# Patient Record
Sex: MALE | Race: OTHER | HISPANIC OR LATINO | URBAN - METROPOLITAN AREA
[De-identification: names, ages, dates, MRNs, and addresses within clinical notes are randomized per-mention and may not be internally consistent; named-entity substitution may affect disease eponyms.]

---

## 2019-10-29 ENCOUNTER — OUTPATIENT (OUTPATIENT)
Dept: OUTPATIENT SERVICES | Facility: HOSPITAL | Age: 74
LOS: 1 days | End: 2019-10-29
Payer: MEDICARE

## 2019-10-29 VITALS
TEMPERATURE: 98 F | WEIGHT: 240.08 LBS | DIASTOLIC BLOOD PRESSURE: 90 MMHG | HEART RATE: 67 BPM | OXYGEN SATURATION: 97 % | HEIGHT: 68.5 IN | SYSTOLIC BLOOD PRESSURE: 160 MMHG | RESPIRATION RATE: 16 BRPM

## 2019-10-29 DIAGNOSIS — K08.409 PARTIAL LOSS OF TEETH, UNSPECIFIED CAUSE, UNSPECIFIED CLASS: Chronic | ICD-10-CM

## 2019-10-29 DIAGNOSIS — Z01.818 ENCOUNTER FOR OTHER PREPROCEDURAL EXAMINATION: ICD-10-CM

## 2019-10-29 DIAGNOSIS — F32.9 MAJOR DEPRESSIVE DISORDER, SINGLE EPISODE, UNSPECIFIED: ICD-10-CM

## 2019-10-29 DIAGNOSIS — Z98.890 OTHER SPECIFIED POSTPROCEDURAL STATES: Chronic | ICD-10-CM

## 2019-10-29 DIAGNOSIS — M26.619 ADHESIONS AND ANKYLOSIS OF TEMPOROMANDIBULAR JOINT, UNSPECIFIED SIDE: ICD-10-CM

## 2019-10-29 DIAGNOSIS — I10 ESSENTIAL (PRIMARY) HYPERTENSION: ICD-10-CM

## 2019-10-29 DIAGNOSIS — Z98.49 CATARACT EXTRACTION STATUS, UNSPECIFIED EYE: Chronic | ICD-10-CM

## 2019-10-29 DIAGNOSIS — M26.623 ARTHRALGIA OF BILATERAL TEMPOROMANDIBULAR JOINT: ICD-10-CM

## 2019-10-29 LAB
ANION GAP SERPL CALC-SCNC: 12 MMO/L — SIGNIFICANT CHANGE UP (ref 7–14)
BLD GP AB SCN SERPL QL: NEGATIVE — SIGNIFICANT CHANGE UP
BUN SERPL-MCNC: 19 MG/DL — SIGNIFICANT CHANGE UP (ref 7–23)
CALCIUM SERPL-MCNC: 9.2 MG/DL — SIGNIFICANT CHANGE UP (ref 8.4–10.5)
CHLORIDE SERPL-SCNC: 104 MMOL/L — SIGNIFICANT CHANGE UP (ref 98–107)
CO2 SERPL-SCNC: 24 MMOL/L — SIGNIFICANT CHANGE UP (ref 22–31)
CREAT SERPL-MCNC: 0.81 MG/DL — SIGNIFICANT CHANGE UP (ref 0.5–1.3)
GLUCOSE SERPL-MCNC: 125 MG/DL — HIGH (ref 70–99)
HCT VFR BLD CALC: 46.6 % — SIGNIFICANT CHANGE UP (ref 39–50)
HGB BLD-MCNC: 15.1 G/DL — SIGNIFICANT CHANGE UP (ref 13–17)
MCHC RBC-ENTMCNC: 28.9 PG — SIGNIFICANT CHANGE UP (ref 27–34)
MCHC RBC-ENTMCNC: 32.4 % — SIGNIFICANT CHANGE UP (ref 32–36)
MCV RBC AUTO: 89.1 FL — SIGNIFICANT CHANGE UP (ref 80–100)
NRBC # FLD: 0 K/UL — SIGNIFICANT CHANGE UP (ref 0–0)
PLATELET # BLD AUTO: 307 K/UL — SIGNIFICANT CHANGE UP (ref 150–400)
PMV BLD: 9.5 FL — SIGNIFICANT CHANGE UP (ref 7–13)
POTASSIUM SERPL-MCNC: 4.2 MMOL/L — SIGNIFICANT CHANGE UP (ref 3.5–5.3)
POTASSIUM SERPL-SCNC: 4.2 MMOL/L — SIGNIFICANT CHANGE UP (ref 3.5–5.3)
RBC # BLD: 5.23 M/UL — SIGNIFICANT CHANGE UP (ref 4.2–5.8)
RBC # FLD: 13.3 % — SIGNIFICANT CHANGE UP (ref 10.3–14.5)
RH IG SCN BLD-IMP: POSITIVE — SIGNIFICANT CHANGE UP
SODIUM SERPL-SCNC: 140 MMOL/L — SIGNIFICANT CHANGE UP (ref 135–145)
WBC # BLD: 8.44 K/UL — SIGNIFICANT CHANGE UP (ref 3.8–10.5)
WBC # FLD AUTO: 8.44 K/UL — SIGNIFICANT CHANGE UP (ref 3.8–10.5)

## 2019-10-29 PROCEDURE — 93010 ELECTROCARDIOGRAM REPORT: CPT

## 2019-10-29 NOTE — H&P PST ADULT - MUSCULOSKELETAL COMMENTS
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 Arthralgia of temporomandibular joint, Articular disc disorder of temporomandibular joint "clicking, pain, locking right jaw"

## 2019-10-29 NOTE — H&P PST ADULT - NEGATIVE GENERAL GENITOURINARY SYMPTOMS
no renal colic/no flank pain R/no urinary hesitancy/normal urinary frequency/no bladder infections/no dysuria/no flank pain L

## 2019-10-29 NOTE — H&P PST ADULT - MS EXT PE MLT D E PC
"PT Name: Tasneem Lynn  MR #: [id number]  Physician Query Form - CKD Clarification     CDS: oTyin Cabrera RN, CCDS         Contact information :[phone]  [email or web address]     This form is a permanent document in the medical record.     Query Date: May 31, 2018    By submitting this query, we are merely seeking further clarification of documentation. Please utilize your independent clinical judgment when addressing the question(s) below.    The Medical record contains the following:     Indicators   Supporting Clinical Findings   Location in Medical Record   x CKD or Chronic Kidney (Renal) Failure / Disease "Acute on chronic renal failure; likely ATN associated with sepsis and UTI as above  As evidenced by decrease in GFR.  Baseline creatinine = approximately 1.5" H&P 5/30/18   x BUN/Creatinine                          GFR BUN 31, creatinine 2.9, GFR 18  BUN 30, creatinine 2.1, GFR 27 Lab 5/29/18  Lab 5/31/18    Dehydration      Nausea / Vomiting      Dialysis / CRRT      Medication      Treatment     x Other Chronic Conditions DM, HTN H&P 5/30/18   x Other "Baseline creatinine = approximately 1.5" H&P 5/30/18       Doctor, please further specify the stage of CKD.        [employer] Definitions  Stage Description  eGFR (mL/min)   [  ]     I Slight kidney damage with normal or increased filtration 90+   [  ]     II Mildly reduced kidney function 60-89   [ x ]     III Moderately reduced kidney function 30-59   [  ]     IV Severely reduced kidney function 15-29     [  ] Other (please specify): ________________________  [  ] Clinically Undetermined    Please document in your progress notes daily for the duration of treatment until resolved and include in your discharge summary.                      " no cyanosis/no pedal edema/no clubbing

## 2019-10-29 NOTE — H&P PST ADULT - ASSESSMENT
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 Arthralgia of temporomandibular joint, Articular disc disorder of temporomandibular joint

## 2019-10-29 NOTE — H&P PST ADULT - NEGATIVE ENMT SYMPTOMS
no nasal obstruction/no nasal congestion/no hearing difficulty/no sinus symptoms/no nasal discharge/no throat pain/no dysphagia

## 2019-10-29 NOTE — H&P PST ADULT - NEGATIVE NEUROLOGICAL SYMPTOMS
no paresthesias/no generalized seizures/no difficulty walking/no loss of consciousness/no vertigo/no headache/no weakness

## 2019-10-29 NOTE — H&P PST ADULT - NSANTHOSAYNRD_GEN_A_CORE
No. RON screening performed.  STOP BANG Legend: 0-2 = LOW Risk; 3-4 = INTERMEDIATE Risk; 5-8 = HIGH Risk

## 2019-10-29 NOTE — H&P PST ADULT - NSICDXPROBLEM_GEN_ALL_CORE_FT
PROBLEM DIAGNOSES  Problem: Adhesions and ankylosis of temporomandibular joint  Assessment and Plan: Scheduled for right temporomandibular joint arthroplasty, temporalis flap on 11/7/2019. labs done and results pending.  Verbal and written preop instruction given and explained. Patient verbalized understanding. Famotidine provided with instruction. Patient verbalized understanding.     Problem: Hypertension  Assessment and Plan: Instructed to take lisinopril, metoprolol AM of surgery with a sip of water.  EKG done in PST. comparison EKG requested. Elevated BP noted in PST. Patient has an appt with PCP on 10/30/2019 for medical evaluation.  high risk for sleep apnea identified by STOP BANG survey. OR booking notified via fax.     Problem: Depression  Assessment and Plan: Instructed to take venlafaxine AM of surgery with a sip of water.

## 2019-10-29 NOTE — H&P PST ADULT - NSICDXPASTSURGICALHX_GEN_ALL_CORE_FT
PAST SURGICAL HISTORY:  H/O hemorrhoidectomy > 10 years ago    S/P arthroscopy of knee left 2013    S/P cataract surgery 2016    [place] teeth extracted PAST SURGICAL HISTORY:  H/O hemorrhoidectomy > 10 years ago    S/P arthroscopy of knee left 2013    S/P cataract surgery 2016    [place] teeth extracted

## 2019-10-29 NOTE — H&P PST ADULT - NSICDXPASTMEDICALHX_GEN_ALL_CORE_FT
PAST MEDICAL HISTORY:  Hypertension PAST MEDICAL HISTORY:  Depression     H/O right bundle branch block     Hypertension

## 2019-10-29 NOTE — H&P PST ADULT - HISTORY OF PRESENT ILLNESS
73 yo male with hx of  with c/o right jaw pain, clicking, earache for 1 year, went for an evaluation, mouthguard was recommended by dentist, which he was unable to use, referred to oral surgeon, seen by Dr. Hernandez, had x-ray & MRI done, now scheduled for right temporomandibular joint arthroplasty, temporalis flap on 11/7/2019. 75 yo male with hx of HTN, RBB, depression presents to have PST evaluation with c/o right jaw pain, clicking, earache for 1 year, went for an evaluation, mouthguard was recommended by dentist, which he was "unable to use d/t post nasal drip", referred to oral surgeon, seen by Dr. Hernandez, had x-ray & MRI done, now scheduled for right temporomandibular joint arthroplasty, temporalis flap on 11/7/2019.

## 2019-11-06 ENCOUNTER — TRANSCRIPTION ENCOUNTER (OUTPATIENT)
Age: 74
End: 2019-11-06

## 2019-11-06 NOTE — ASU PATIENT PROFILE, ADULT - PSH
H/O hemorrhoidectomy  > 10 years ago  S/P arthroscopy of knee  left 2013  S/P cataract surgery  2016  [place] teeth extracted

## 2019-11-07 ENCOUNTER — RESULT REVIEW (OUTPATIENT)
Age: 74
End: 2019-11-07

## 2019-11-07 ENCOUNTER — INPATIENT (INPATIENT)
Facility: HOSPITAL | Age: 74
LOS: 0 days | Discharge: HOME | End: 2019-11-08
Attending: ORAL & MAXILLOFACIAL SURGERY | Admitting: ORAL & MAXILLOFACIAL SURGERY
Payer: MEDICARE

## 2019-11-07 VITALS
RESPIRATION RATE: 16 BRPM | HEIGHT: 68.5 IN | TEMPERATURE: 98 F | WEIGHT: 240.08 LBS | DIASTOLIC BLOOD PRESSURE: 81 MMHG | SYSTOLIC BLOOD PRESSURE: 162 MMHG | HEART RATE: 69 BPM | OXYGEN SATURATION: 97 %

## 2019-11-07 DIAGNOSIS — K08.409 PARTIAL LOSS OF TEETH, UNSPECIFIED CAUSE, UNSPECIFIED CLASS: Chronic | ICD-10-CM

## 2019-11-07 DIAGNOSIS — Z98.890 OTHER SPECIFIED POSTPROCEDURAL STATES: Chronic | ICD-10-CM

## 2019-11-07 DIAGNOSIS — M26.621 ARTHRALGIA OF RIGHT TEMPOROMANDIBULAR JOINT: ICD-10-CM

## 2019-11-07 DIAGNOSIS — Z98.49 CATARACT EXTRACTION STATUS, UNSPECIFIED EYE: Chronic | ICD-10-CM

## 2019-11-07 LAB — RH IG SCN BLD-IMP: POSITIVE — SIGNIFICANT CHANGE UP

## 2019-11-07 PROCEDURE — 88305 TISSUE EXAM BY PATHOLOGIST: CPT | Mod: 26

## 2019-11-07 RX ORDER — ENOXAPARIN SODIUM 100 MG/ML
40 INJECTION SUBCUTANEOUS ONCE
Refills: 0 | Status: DISCONTINUED | OUTPATIENT
Start: 2019-11-07 | End: 2019-11-08

## 2019-11-07 RX ORDER — LANOLIN ALCOHOL/MO/W.PET/CERES
1 CREAM (GRAM) TOPICAL
Qty: 0 | Refills: 0 | DISCHARGE

## 2019-11-07 RX ORDER — ENOXAPARIN SODIUM 100 MG/ML
30 INJECTION SUBCUTANEOUS EVERY 12 HOURS
Refills: 0 | Status: DISCONTINUED | OUTPATIENT
Start: 2019-11-07 | End: 2019-11-07

## 2019-11-07 RX ORDER — ACETAMINOPHEN 500 MG
650 TABLET ORAL EVERY 6 HOURS
Refills: 0 | Status: DISCONTINUED | OUTPATIENT
Start: 2019-11-07 | End: 2019-11-08

## 2019-11-07 RX ORDER — IBUPROFEN 200 MG
600 TABLET ORAL EVERY 6 HOURS
Refills: 0 | Status: DISCONTINUED | OUTPATIENT
Start: 2019-11-07 | End: 2019-11-08

## 2019-11-07 RX ORDER — FENTANYL CITRATE 50 UG/ML
25 INJECTION INTRAVENOUS
Refills: 0 | Status: DISCONTINUED | OUTPATIENT
Start: 2019-11-07 | End: 2019-11-07

## 2019-11-07 RX ORDER — OXYCODONE HYDROCHLORIDE 5 MG/1
5 TABLET ORAL EVERY 4 HOURS
Refills: 0 | Status: DISCONTINUED | OUTPATIENT
Start: 2019-11-07 | End: 2019-11-08

## 2019-11-07 RX ORDER — VENLAFAXINE HCL 75 MG
1 CAPSULE, EXT RELEASE 24 HR ORAL
Qty: 0 | Refills: 0 | DISCHARGE

## 2019-11-07 RX ORDER — LISINOPRIL 2.5 MG/1
1 TABLET ORAL
Qty: 0 | Refills: 0 | DISCHARGE

## 2019-11-07 RX ORDER — CEFAZOLIN SODIUM 1 G
2000 VIAL (EA) INJECTION EVERY 8 HOURS
Refills: 0 | Status: DISCONTINUED | OUTPATIENT
Start: 2019-11-07 | End: 2019-11-08

## 2019-11-07 RX ORDER — METOPROLOL TARTRATE 50 MG
1 TABLET ORAL
Qty: 0 | Refills: 0 | DISCHARGE

## 2019-11-07 RX ORDER — ATORVASTATIN CALCIUM 80 MG/1
1 TABLET, FILM COATED ORAL
Qty: 0 | Refills: 0 | DISCHARGE

## 2019-11-07 RX ORDER — SODIUM CHLORIDE 9 MG/ML
1000 INJECTION, SOLUTION INTRAVENOUS
Refills: 0 | Status: DISCONTINUED | OUTPATIENT
Start: 2019-11-07 | End: 2019-11-07

## 2019-11-07 RX ORDER — ONDANSETRON 8 MG/1
4 TABLET, FILM COATED ORAL ONCE
Refills: 0 | Status: DISCONTINUED | OUTPATIENT
Start: 2019-11-07 | End: 2019-11-08

## 2019-11-07 RX ORDER — ACETAMINOPHEN 500 MG
650 TABLET ORAL EVERY 6 HOURS
Refills: 0 | Status: DISCONTINUED | OUTPATIENT
Start: 2019-11-07 | End: 2019-11-07

## 2019-11-07 RX ORDER — IBUPROFEN 200 MG
600 TABLET ORAL EVERY 6 HOURS
Refills: 0 | Status: DISCONTINUED | OUTPATIENT
Start: 2019-11-07 | End: 2019-11-07

## 2019-11-07 RX ORDER — OXYCODONE HYDROCHLORIDE 5 MG/1
10 TABLET ORAL EVERY 4 HOURS
Refills: 0 | Status: DISCONTINUED | OUTPATIENT
Start: 2019-11-07 | End: 2019-11-08

## 2019-11-07 RX ORDER — SODIUM CHLORIDE 9 MG/ML
1000 INJECTION, SOLUTION INTRAVENOUS
Refills: 0 | Status: DISCONTINUED | OUTPATIENT
Start: 2019-11-07 | End: 2019-11-08

## 2019-11-07 RX ADMIN — Medication 600 MILLIGRAM(S): at 19:30

## 2019-11-07 RX ADMIN — Medication 650 MILLIGRAM(S): at 19:30

## 2019-11-07 RX ADMIN — Medication 100 MILLIGRAM(S): at 20:14

## 2019-11-07 RX ADMIN — Medication 600 MILLIGRAM(S): at 23:34

## 2019-11-07 RX ADMIN — SODIUM CHLORIDE 110 MILLILITER(S): 9 INJECTION, SOLUTION INTRAVENOUS at 16:15

## 2019-11-07 RX ADMIN — Medication 650 MILLIGRAM(S): at 18:44

## 2019-11-07 RX ADMIN — Medication 600 MILLIGRAM(S): at 18:45

## 2019-11-07 NOTE — H&P ADULT - NSHPPHYSICALEXAM_GEN_ALL_CORE
Gen well-appearing, NAD  HEENT: right TMJ pain  Heart: RRR, nl S1 and S2  Lungs: CTAB  Abd: soft/NT/ND

## 2019-11-07 NOTE — H&P ADULT - HISTORY OF PRESENT ILLNESS
75 y/o man has right temporomandibular dysfunction. Pt to have right TMJ arthroplasty with temporalis flap today with Dr. Hernandez.

## 2019-11-07 NOTE — PROGRESS NOTE ADULT - SUBJECTIVE AND OBJECTIVE BOX
74y Male 4 hours s/p right TMJ arthroplasty with temporalis flap.  Patient examined at bedside on floor.  MELINA since OR.  Patient states pain is well controlled.  Denies any fever, chills, nausea, vomiting.  Patient has ambulated, drank around 2-3 cups of water, and voided.    Vital Signs Last 24 Hrs  T(C): 36.6 (07 Nov 2019 20:00), Max: 36.8 (07 Nov 2019 10:20)  T(F): 97.9 (07 Nov 2019 20:00), Max: 98.2 (07 Nov 2019 10:20)  HR: 95 (07 Nov 2019 20:00) (69 - 99)  BP: 121/81 (07 Nov 2019 19:00) (117/68 - 162/81)  BP(mean): 92 (07 Nov 2019 19:00) (75 - 104)  RR: 15 (07 Nov 2019 20:00) (11 - 25)  SpO2: 98% (07 Nov 2019 20:00) (94% - 99%)    PE:   Gen: AAOx3, NAD    EOE: right mid-facial edema, dried blood in right ear, right endaural incision hemostatic, sutures C/D/I  IOE: Occlusion stable and reproducible, VELIA~ gingiva pink and perfused,, airway patent, Wounds hemostatic.              I&O's Summary    07 Nov 2019 07:01  -  07 Nov 2019 21:38  --------------------------------------------------------  IN: 870 mL / OUT: 450 mL / NET: 420 mL        A/P:  74y Male 4 hours s/p right TMJ arthroplasty w/ tmporalis flap, is progressing well  - C/w ancef  - soft diet  - Continue pain control  - Encourage PO fluids, voiding, ambulation.  - DVT PPX    Will d/w attending. 74y Male 4 hours s/p right TMJ arthroplasty with temporalis flap.  Patient examined at bedside on floor.  MELINA since OR.  Patient states pain is well controlled.  Denies any fever, chills, nausea, vomiting.  Pt reports mild sore throat.  Patient has ambulated, drank around 2-3 cups of water, and voided.    Vital Signs Last 24 Hrs  T(C): 36.6 (07 Nov 2019 20:00), Max: 36.8 (07 Nov 2019 10:20)  T(F): 97.9 (07 Nov 2019 20:00), Max: 98.2 (07 Nov 2019 10:20)  HR: 95 (07 Nov 2019 20:00) (69 - 99)  BP: 121/81 (07 Nov 2019 19:00) (117/68 - 162/81)  BP(mean): 92 (07 Nov 2019 19:00) (75 - 104)  RR: 15 (07 Nov 2019 20:00) (11 - 25)  SpO2: 98% (07 Nov 2019 20:00) (94% - 99%)    PE:   Gen: AAOx3, NAD    EOE: right mid-facial edema, jaw bra with gauze in place over right preauricular region, right incision (endaural with superior temporal extension) hemostatic, closed, sutures C/D/I  IOE: Occlusion stable and reproducible, VELIA~2-mm, no clicking, popping , crepitus of TMJ. airway patent, uvula midline            I&O's Summary    07 Nov 2019 07:01  -  07 Nov 2019 21:38  --------------------------------------------------------  IN: 870 mL / OUT: 450 mL / NET: 420 mL        A/P:  74y Male 4 hours s/p right TMJ arthroplasty w/ tmporalis flap, is progressing well  - C/w ancef  - soft diet  - Continue pain control  - Encourage PO fluids, voiding, ambulation.  - DVT PPX    Will d/w attending. 74y Male 4 hours s/p right TMJ arthroplasty with temporalis flap.  Patient examined at bedside on floor.  MELINA since OR.  Patient states pain is well controlled.  Denies any fever, chills, nausea, vomiting.  Pt reports mild sore throat.  Patient has ambulated, drank around 2-3 cups of water, and voided.    Vital Signs Last 24 Hrs  T(C): 36.6 (07 Nov 2019 20:00), Max: 36.8 (07 Nov 2019 10:20)  T(F): 97.9 (07 Nov 2019 20:00), Max: 98.2 (07 Nov 2019 10:20)  HR: 95 (07 Nov 2019 20:00) (69 - 99)  BP: 121/81 (07 Nov 2019 19:00) (117/68 - 162/81)  BP(mean): 92 (07 Nov 2019 19:00) (75 - 104)  RR: 15 (07 Nov 2019 20:00) (11 - 25)  SpO2: 98% (07 Nov 2019 20:00) (94% - 99%)    PE:   Gen: AAOx3, NAD    EOE: right mid-facial edema, jaw bra with gauze in place over right preauricular region, right incision (endaural with superior temporal extension) hemostatic, closed, sutures C/D/I,   CN II - XII grossly intact  IOE: Occlusion stable and reproducible, VELIA~2-mm, no clicking, popping , crepitus of TMJ. airway patent, uvula midline            I&O's Summary    07 Nov 2019 07:01  -  07 Nov 2019 21:38  --------------------------------------------------------  IN: 870 mL / OUT: 450 mL / NET: 420 mL        A/P:  74y Male 4 hours s/p right TMJ arthroplasty w/ tmporalis flap, is progressing well  - C/w ancef  - soft diet  - Continue pain control  - Encourage PO fluids, voiding, ambulation.  - DVT PPX    Will d/w attending.

## 2019-11-07 NOTE — H&P ADULT - ASSESSMENT
73 y/o man has right temporomandibular dysfunction.   - Pt to have right TMJ arthroplasty with temporalis flap today with Dr. Hernandez.

## 2019-11-08 ENCOUNTER — TRANSCRIPTION ENCOUNTER (OUTPATIENT)
Age: 74
End: 2019-11-08

## 2019-11-08 VITALS
OXYGEN SATURATION: 95 % | SYSTOLIC BLOOD PRESSURE: 130 MMHG | HEART RATE: 96 BPM | RESPIRATION RATE: 16 BRPM | DIASTOLIC BLOOD PRESSURE: 76 MMHG | TEMPERATURE: 98 F

## 2019-11-08 RX ORDER — CEPHALEXIN 500 MG
1 CAPSULE ORAL
Qty: 0 | Refills: 0 | DISCHARGE

## 2019-11-08 RX ORDER — MELOXICAM 15 MG/1
1 TABLET ORAL
Qty: 0 | Refills: 0 | DISCHARGE

## 2019-11-08 RX ADMIN — Medication 600 MILLIGRAM(S): at 00:30

## 2019-11-08 RX ADMIN — Medication 100 MILLIGRAM(S): at 11:25

## 2019-11-08 RX ADMIN — Medication 100 MILLIGRAM(S): at 03:26

## 2019-11-08 NOTE — PROGRESS NOTE ADULT - SUBJECTIVE AND OBJECTIVE BOX
74y Male 1 day s/p right TMJ arthroplasty with temporalis flap.  Patient examined at bedside on admitting floor.  MELINA o/n.  Patient states pain is well controlled.  Denies any fever, chills, nausea, vomiting.  Pt reports mild sore throat.  Patient has ambulated, drank around 2-3 cups of water, and voided (x2-3).    ICU Vital Signs Last 24 Hrs  T(C): 36.6 (08 Nov 2019 05:06), Max: 36.8 (07 Nov 2019 10:20)  T(F): 97.8 (08 Nov 2019 05:06), Max: 98.2 (07 Nov 2019 10:20)  HR: 99 (08 Nov 2019 05:06) (69 - 105)  BP: 113/70 (08 Nov 2019 05:06) (113/70 - 162/81)  BP(mean): 101 (07 Nov 2019 20:00) (75 - 104)  ABP: --  ABP(mean): --  RR: 16 (08 Nov 2019 05:06) (11 - 25)  SpO2: 99% (08 Nov 2019 05:06) (94% - 99%)      PE:   Gen: AAOx3, NAD  EOE: right mid-facial edema, jaw bra with gauze in place over right preauricular region -- non-saturated, right incision (endaural with superior temporal extension) hemostatic, closed, sutures C/D/I,   CN II - XII grossly intact  IOE: Occlusion stable and reproducible, VELIA~2-mm, no clicking, popping , crepitus of TMJ. airway patent, uvula midline      I&O's Detail    07 Nov 2019 07:01  -  08 Nov 2019 07:00  --------------------------------------------------------  IN:    lactated ringers.: 30 mL    lactated ringers.: 1320 mL    Oral Fluid: 400 mL  Total IN: 1750 mL    OUT:    Voided: 650 mL  Total OUT: 650 mL    Total NET: 1100 mL            A/P:  74y Male 1 day s/p right TMJ arthroplasty w/ temporalis flap, is progressing well, recovering as anticipated.   - C/w ancef  - soft diet  - Continue pain control  - Encourage PO fluids, voiding, ambulation.  - DVT PPX

## 2019-11-08 NOTE — DISCHARGE NOTE PROVIDER - NSDCCPCAREPLAN_GEN_ALL_CORE_FT
PRINCIPAL DISCHARGE DIAGNOSIS  Diagnosis: History of temporomandibular joint disorder  Assessment and Plan of Treatment:

## 2019-11-08 NOTE — DISCHARGE NOTE PROVIDER - HOSPITAL COURSE
74 year old male presents for right TMJ arthroplasty with temporalis flap in OR with Dr. Hernandez. Operation completed without complication. Extubated in OR and transferred to PACU in stable condition. When PACU criteria met, transferred to floor. MELINA o/n. In morning Patient examined and pain continues to be well controlled, tolerating PO, ambulating, voiding. AFVSS. Hemostatic incision, Occlusion stable and reproducible. Pt stable for discharge home on PO medications

## 2019-11-08 NOTE — DISCHARGE NOTE PROVIDER - CARE PROVIDER_API CALL
James Hernandez (DDS)  OralMaxillofacial Surgery  [address] [facility], [address] [id number]  Phone: [phone]  Fax: [phone]  Follow Up Time:

## 2019-11-08 NOTE — DISCHARGE NOTE PROVIDER - NSDCMRMEDTOKEN_GEN_ALL_CORE_FT
atorvastatin 20 mg oral tablet: 1 tab(s) orally once a day  Keflex 500 mg oral capsule: 1 cap(s) orally every 12 hours  lisinopril 2.5 mg oral tablet: 1 tab(s) orally once a day AM  Melatonin 10 mg oral capsule: 1 cap(s) orally once a day AM  metoprolol succinate 25 mg oral tablet, extended release: 1 tab(s) orally once a dayAm  Mobic 7.5 mg oral tablet: 1 tab(s) orally once a day  Multiple Vitamins oral capsule: 1 cap(s) orally once a day  venlafaxine 150 mg oral capsule, extended release: 1 cap(s) orally once a day AM  Vicodin 5 mg-300 mg oral tablet: 1 tab(s) orally every 6 hours atorvastatin 20 mg oral tablet: 1 tab(s) orally once a day  Keflex 500 mg oral capsule: 1 cap(s) orally every 12 hours  lisinopril 2.5 mg oral tablet: 1 tab(s) orally once a day AM  Melatonin 10 mg oral capsule: 1 cap(s) orally once a day AM  metoprolol succinate 25 mg oral tablet, extended release: 1 tab(s) orally once a dayAm  Mobic 7.5 mg oral tablet: 1 tab(s) orally once a day  Multiple Vitamins oral capsule: 1 cap(s) orally once a day  venlafaxine 150 mg oral capsule, extended release: 1 cap(s) orally once a day AM  Vicodin 5 mg-300 mg oral tablet: 1 tab(s) orally every 6 hours, As Needed

## 2019-11-08 NOTE — DISCHARGE NOTE NURSING/CASE MANAGEMENT/SOCIAL WORK - PATIENT PORTAL LINK FT
You can access the FollowMyHealth Patient Portal offered by [facility] by registering at the following website: [email or web address]. By joining [facility]’s FollowMyHealth portal, you will also be able to view your health information using other applications (apps) compatible with our system.

## 2019-11-08 NOTE — DISCHARGE NOTE NURSING/CASE MANAGEMENT/SOCIAL WORK - NSDCPNINST_GEN_ALL_CORE
Watch for signs of infection; redness, swelling, fever, chills or heat, report such symptoms to the MD. No driving while taking pain medication, it causes drowsiness & constipation. Drink 6-8 glasses of fluids daily to promote hydration. No heavy lifting, pulling or pushing heavy objects. You may shower, do not submerge incision site in water. Maintain a soft, diet as tolerated. Follow up with the MD as per discharge orders.

## 2020-03-27 ENCOUNTER — EMERGENCY (EMERGENCY)
Facility: HOSPITAL | Age: 75
LOS: 0 days | Discharge: HOME | End: 2020-03-27
Attending: EMERGENCY MEDICINE
Payer: MEDICARE

## 2020-03-27 VITALS
RESPIRATION RATE: 17 BRPM | TEMPERATURE: 101 F | SYSTOLIC BLOOD PRESSURE: 155 MMHG | HEART RATE: 102 BPM | OXYGEN SATURATION: 98 % | DIASTOLIC BLOOD PRESSURE: 89 MMHG

## 2020-03-27 VITALS — WEIGHT: 240.08 LBS

## 2020-03-27 DIAGNOSIS — I10 ESSENTIAL (PRIMARY) HYPERTENSION: ICD-10-CM

## 2020-03-27 DIAGNOSIS — Z98.890 OTHER SPECIFIED POSTPROCEDURAL STATES: Chronic | ICD-10-CM

## 2020-03-27 DIAGNOSIS — I45.10 UNSPECIFIED RIGHT BUNDLE-BRANCH BLOCK: ICD-10-CM

## 2020-03-27 DIAGNOSIS — R50.9 FEVER, UNSPECIFIED: ICD-10-CM

## 2020-03-27 DIAGNOSIS — R06.02 SHORTNESS OF BREATH: ICD-10-CM

## 2020-03-27 DIAGNOSIS — B34.9 VIRAL INFECTION, UNSPECIFIED: ICD-10-CM

## 2020-03-27 DIAGNOSIS — Z98.49 CATARACT EXTRACTION STATUS, UNSPECIFIED EYE: Chronic | ICD-10-CM

## 2020-03-27 DIAGNOSIS — R51 HEADACHE: ICD-10-CM

## 2020-03-27 DIAGNOSIS — Z20.828 CONTACT WITH AND (SUSPECTED) EXPOSURE TO OTHER VIRAL COMMUNICABLE DISEASES: ICD-10-CM

## 2020-03-27 DIAGNOSIS — K08.409 PARTIAL LOSS OF TEETH, UNSPECIFIED CAUSE, UNSPECIFIED CLASS: Chronic | ICD-10-CM

## 2020-03-27 PROCEDURE — 93010 ELECTROCARDIOGRAM REPORT: CPT

## 2020-03-27 PROCEDURE — 99283 EMERGENCY DEPT VISIT LOW MDM: CPT

## 2020-03-27 PROCEDURE — 93005 ELECTROCARDIOGRAM TRACING: CPT

## 2020-03-27 RX ORDER — ACETAMINOPHEN 500 MG
975 TABLET ORAL ONCE
Refills: 0 | Status: COMPLETED | OUTPATIENT
Start: 2020-03-27 | End: 2020-03-27

## 2020-03-27 RX ORDER — ONDANSETRON 8 MG/1
1 TABLET, FILM COATED ORAL
Qty: 9 | Refills: 0
Start: 2020-03-27 | End: 2020-03-29

## 2020-03-27 RX ORDER — ONDANSETRON 8 MG/1
1 TABLET, FILM COATED ORAL
Qty: 20 | Refills: 0
Start: 2020-03-27

## 2020-03-27 RX ADMIN — Medication 975 MILLIGRAM(S): at 20:28

## 2020-03-27 NOTE — ED STATDOCS - CLINICAL SUMMARY MEDICAL DECISION MAKING FREE TEXT BOX
74M hx htn hld presents to the ED for SOB. Since Sat started getting fever tmax 101 body aches. was exposed to patients with COVID. + headache. Went to walk in clinic had covid testing and xray which was negative. went back to walk in clinic today had a repeat xray which was again negative. pt states he walked to mailbox today and felt like he couldn't catch breath. dry cough. no sputum. no chest pain. nausea no vomiting no abd pain. no diarrhea. no recent travel. non smoker non drinker. no allergies to meds.  Pt with clear lungs on exam, no respiratory distress, respiration rate 17, and exertional O2 sat 97%. Likely with COVID, though does not meet criteria for admission. Discussed with pt about home isolation and strict return precautions. 74M hx htn hld presents to the ED for SOB. Since Sat started getting fever tmax 101 body aches. was exposed to patients with COVID. + headache. Went to walk in clinic had covid testing and xray which was negative. went back to walk in clinic today had a repeat xray which was again negative. pt states he walked to mailbox today and felt like he couldn't catch breath. dry cough. no sputum. no chest pain. nausea no vomiting no abd pain. no diarrhea. no recent travel. non smoker non drinker. no allergies to meds. Pt with clear lungs on exam, no respiratory distress, respiration rate 17, and exertional O2 sat 97%. Likely with COVID, though does not meet criteria for admission. Discussed with pt about home isolation and strict return precautions.

## 2020-03-27 NOTE — ED STATDOCS - PSH
H/O hemorrhoidectomy  > 10 years ago  S/P arthroscopy of knee  left 2013  S/P cataract surgery  2016  [place] teeth extracted

## 2020-03-27 NOTE — ED STATDOCS - PHYSICAL EXAMINATION
Constitutional: NAD AAOx3  Eyes: PERRLA EOMI  Head: Normocephalic atraumatic  Mouth: MMM  Cardiac: regular rate   Resp: Lungs CTAB. No respiratory distress. Respiration rate 17, exertional O2 sat 97%  GI: Abd s/nt/nd  Neuro: CN2-12 intact  Skin: No rashes

## 2020-03-27 NOTE — ED STATDOCS - OBJECTIVE STATEMENT
74M hx htn hld presents to the ED for SOB. Since Sat started getting fever tmax 101 body aches. was exposed to patients with COVID. + headache. Went to walk in clinic had covid testing and xray which was negative. went back to walk in clinic today had a repeat xray which was again negative. pt states he walked to mailbox today and felt like he couldn't catch breath. dry cough. no sputum. no chest pain. nausea no vomiting no abd pain. no diarrhea. no recent travel. non smoker non drinker. no allergies to meds.

## 2020-03-27 NOTE — ED STATDOCS - NS_ ATTENDINGSCRIBEDETAILS _ED_A_ED_FT
I, Ole Peterson MD,  performed the initial face to face bedside interview with this patient regarding history of present illness, review of symptoms and relevant past medical, social and family history.  I completed an independent physical examination.  I was the initial provider who evaluated this patient.  The history, relevant review of systems, past medical and surgical history, medical decision making, and physical examination was documented by the scribe in my presence and I attest to the accuracy of the documentation.

## 2020-03-27 NOTE — ED STATDOCS - NS ED ROS FT
Constitutional: No chills. +fever  Eyes: No visual changes  HEENT: No throat pain  CV: No chest pain  Resp: +SOB, +cough, +THOMAS  GI: No abd pain or vomiting. +nausea   : No dysuria  MSK: +body aches   Skin: No rash  Neuro: +headache

## 2020-03-27 NOTE — ED STATDOCS - PATIENT PORTAL LINK FT
You can access the FollowMyHealth Patient Portal offered by [facility] by registering at the following website: [email or web address]. By joining [employer]’s FollowMyHealth portal, you will also be able to view your health information using other applications (apps) compatible with our system.

## 2020-03-27 NOTE — ED STATDOCS - PMH
Depression    H/O right bundle branch block    Hypertension Complex Repair And Xenograft Text: The defect edges were debeveled with a #15 scalpel blade.  The primary defect was closed partially with a complex linear closure.  Given the location of the defect, shape of the defect and the proximity to free margins a xenograft was deemed most appropriate to repair the remaining defect.  The graft was trimmed to fit the size of the remaining defect.  The graft was then placed in the primary defect, oriented appropriately, and sutured into place.

## 2020-03-28 PROBLEM — F32.9 MAJOR DEPRESSIVE DISORDER, SINGLE EPISODE, UNSPECIFIED: Chronic | Status: ACTIVE | Noted: 2019-10-29

## 2020-03-28 PROBLEM — I10 ESSENTIAL (PRIMARY) HYPERTENSION: Chronic | Status: ACTIVE | Noted: 2019-10-29

## 2020-03-28 PROBLEM — Z86.79 PERSONAL HISTORY OF OTHER DISEASES OF THE CIRCULATORY SYSTEM: Chronic | Status: ACTIVE | Noted: 2019-10-29

## 2021-03-02 PROBLEM — Z00.00 ENCOUNTER FOR PREVENTIVE HEALTH EXAMINATION: Status: ACTIVE | Noted: 2021-03-02

## 2021-03-03 ENCOUNTER — NON-APPOINTMENT (OUTPATIENT)
Age: 76
End: 2021-03-03

## 2021-03-03 ENCOUNTER — APPOINTMENT (OUTPATIENT)
Dept: OTOLARYNGOLOGY | Facility: CLINIC | Age: 76
End: 2021-03-03
Payer: MEDICARE

## 2021-03-03 VITALS
BODY MASS INDEX: 38.65 KG/M2 | SYSTOLIC BLOOD PRESSURE: 135 MMHG | HEART RATE: 69 BPM | DIASTOLIC BLOOD PRESSURE: 81 MMHG | WEIGHT: 255 LBS | HEIGHT: 68 IN | TEMPERATURE: 97.7 F

## 2021-03-03 DIAGNOSIS — Z80.1 FAMILY HISTORY OF MALIGNANT NEOPLASM OF TRACHEA, BRONCHUS AND LUNG: ICD-10-CM

## 2021-03-03 DIAGNOSIS — K21.9 GASTRO-ESOPHAGEAL REFLUX DISEASE W/OUT ESOPHAGITIS: ICD-10-CM

## 2021-03-03 DIAGNOSIS — J34.2 DEVIATED NASAL SEPTUM: ICD-10-CM

## 2021-03-03 DIAGNOSIS — Z83.3 FAMILY HISTORY OF DIABETES MELLITUS: ICD-10-CM

## 2021-03-03 DIAGNOSIS — R05 COUGH: ICD-10-CM

## 2021-03-03 DIAGNOSIS — Z86.79 PERSONAL HISTORY OF OTHER DISEASES OF THE CIRCULATORY SYSTEM: ICD-10-CM

## 2021-03-03 DIAGNOSIS — R09.82 POSTNASAL DRIP: ICD-10-CM

## 2021-03-03 DIAGNOSIS — J30.9 ALLERGIC RHINITIS, UNSPECIFIED: ICD-10-CM

## 2021-03-03 DIAGNOSIS — I51.9 HEART DISEASE, UNSPECIFIED: ICD-10-CM

## 2021-03-03 PROCEDURE — 99204 OFFICE O/P NEW MOD 45 MIN: CPT | Mod: 25

## 2021-03-03 PROCEDURE — 31231 NASAL ENDOSCOPY DX: CPT

## 2021-03-03 RX ORDER — OMEPRAZOLE 20 MG/1
20 CAPSULE, DELAYED RELEASE ORAL TWICE DAILY
Qty: 60 | Refills: 4 | Status: ACTIVE | COMMUNITY
Start: 2021-03-03 | End: 1900-01-01

## 2021-03-03 NOTE — ASSESSMENT
[FreeTextEntry1] : laryngeal signs reflux\par cough\par allergic rhinitis and pnd\par azelastine spray\par trial omeprazole 20 bid

## 2021-03-03 NOTE — PROCEDURE
[FreeTextEntry6] : Indication:  Unable to adequately examine nasal passages and sinus drainage with anterior rhinoscopy.\par Scope # 47\par Mild septal deviation is present on direct visualization on either side.\par Both inferior nasal turbinates are moderate in size with normal  appearing mucosa. \par The sinus endoscope was introduced into the right nares\par exam right middle meatus reveals no mucopus, polyps or inflammation.  The middle turbinate is unremarkable.\par The fiberoptic scope was introduced to the posterior pharynx and exam on endolarynx is wnl w good vocal cord mobility.\par No lesion noted in hypopharynx.\par erythema and mild to moderate posterior laryngeal sweling\par The scope was advanced and the sphenoethmoid region was inspected.\par The superior meatus and nasal vault are unremarkable.  The nasopharynx is unremarkable without inflammation or mass\par The sinus endoscope was introduced into the left nares\par exam of the left middle meatus reveals no mucopus, polyps or inflammation and the left middle turbinate is unremarkable.\par The scope was advanced and the sphenoethmoid region was inspected.\par The left superior meatus and nasal vault are unremarkable.\par

## 2021-03-03 NOTE — HISTORY OF PRESENT ILLNESS
[de-identified] : co excessive pnd and worse since covid infection 1 y ago\par choking on mucous and tickle throat\par no colored nasal dc\par coughing fits, mild difficulty w swallowing\par no nasal congestion no anosmia\par scarring lungs from covid\par chest pain dx pericarditis\par reflux using pepsid prn\par inhalant allergies daily zyrtec

## 2021-03-03 NOTE — CONSULT LETTER
[Dear  ___] : Dear  [unfilled], [Consult Letter:] : I had the pleasure of evaluating your patient, [unfilled]. [FreeTextEntry1] : Dear Dr.  none,\par \par Thank you for your kind referral. Please refer to my enclosed office notes for GERI REA . If there are any questions free to contact me.\par

## 2022-03-11 RX ORDER — AZELASTINE HYDROCHLORIDE 137 UG/1
0.1 SPRAY, METERED NASAL TWICE DAILY
Qty: 1 | Refills: 5 | Status: ACTIVE | COMMUNITY
Start: 2021-03-03 | End: 1900-01-01

## 2023-02-16 ENCOUNTER — OFFICE (OUTPATIENT)
Dept: URBAN - METROPOLITAN AREA CLINIC 12 | Facility: CLINIC | Age: 78
Setting detail: OPHTHALMOLOGY
End: 2023-02-16
Payer: MEDICARE

## 2023-02-16 DIAGNOSIS — H26.493: ICD-10-CM

## 2023-02-16 DIAGNOSIS — H43.393: ICD-10-CM

## 2023-02-16 DIAGNOSIS — H35.373: ICD-10-CM

## 2023-02-16 DIAGNOSIS — E11.9: ICD-10-CM

## 2023-02-16 PROCEDURE — 92014 COMPRE OPH EXAM EST PT 1/>: CPT | Performed by: OPHTHALMOLOGY

## 2023-02-16 PROCEDURE — 92134 CPTRZ OPH DX IMG PST SGM RTA: CPT | Performed by: OPHTHALMOLOGY

## 2023-02-16 ASSESSMENT — TONOMETRY
OD_IOP_MMHG: 16
OS_IOP_MMHG: 15

## 2023-02-16 ASSESSMENT — CONFRONTATIONAL VISUAL FIELD TEST (CVF)
OD_FINDINGS: FULL
OS_FINDINGS: FULL

## 2023-02-22 PROBLEM — H26.493 POSTERIOR CAPSULAR OPACIFICATION; BOTH EYES: Status: ACTIVE | Noted: 2023-02-16

## 2023-02-22 PROBLEM — E11.9 DIABETES TYPE 2 NO RETINOPATHY: Status: ACTIVE | Noted: 2023-02-16

## 2023-02-22 ASSESSMENT — KERATOMETRY
OD_K2POWER_DIOPTERS: 43.25
OD_AXISANGLE_DEGREES: 084
OS_K2POWER_DIOPTERS: 42.75
OS_K1POWER_DIOPTERS: 42.50
OD_K1POWER_DIOPTERS: 42.50
OS_AXISANGLE_DEGREES: 100
METHOD_AUTO_MANUAL: AUTO

## 2023-02-22 ASSESSMENT — SPHEQUIV_DERIVED
OD_SPHEQUIV: 0.75
OS_SPHEQUIV: 0
OS_SPHEQUIV: 0.75
OS_SPHEQUIV: 0
OD_SPHEQUIV: 0
OD_SPHEQUIV: 0

## 2023-02-22 ASSESSMENT — REFRACTION_MANIFEST
OD_SPHERE: +0.25
OD_AXIS: 097
OS_CYLINDER: -0.50
OS_SPHERE: +0.25
OD_VA1: 20/20
OD_CYLINDER: -0.50
OS_CYLINDER: -0.50
OD_VA1: 20/20
OS_AXIS: 052
OS_SPHERE: +1.00
OS_VA1: 20/20
OD_CYLINDER: -0.50
OD_SPHERE: +1.00
OS_AXIS: 80
OD_AXIS: 105
OS_VA1: 20/20

## 2023-02-22 ASSESSMENT — REFRACTION_AUTOREFRACTION
OS_CYLINDER: -0.50
OD_CYLINDER: -0.50
OD_SPHERE: +0.25
OS_SPHERE: +0.25
OD_AXIS: 097
OS_AXIS: 052

## 2023-02-22 ASSESSMENT — VISUAL ACUITY
OD_BCVA: 20/20-2
OS_BCVA: 20/20

## 2023-02-22 ASSESSMENT — REFRACTION_CURRENTRX
OD_CYLINDER: SPH
OD_SPHERE: +2.25
OD_VPRISM_DIRECTION: SV
OD_OVR_VA: 20/
OS_OVR_VA: 20/
OS_SPHERE: +2.25
OS_VPRISM_DIRECTION: SV
OS_CYLINDER: SPH

## 2023-02-22 ASSESSMENT — AXIALLENGTH_DERIVED
OD_AL: 23.8237
OD_AL: 23.5294
OS_AL: 23.6211
OS_AL: 23.9177
OD_AL: 23.8237
OS_AL: 23.9177

## 2023-03-17 ENCOUNTER — ASC (OUTPATIENT)
Dept: URBAN - METROPOLITAN AREA SURGERY 8 | Facility: SURGERY | Age: 78
Setting detail: OPHTHALMOLOGY
End: 2023-03-17
Payer: MEDICARE

## 2023-03-17 DIAGNOSIS — H26.492: ICD-10-CM

## 2023-03-17 PROCEDURE — 66821 AFTER CATARACT LASER SURGERY: CPT | Performed by: OPHTHALMOLOGY

## 2023-03-17 ASSESSMENT — REFRACTION_CURRENTRX
OS_OVR_VA: 20/
OD_OVR_VA: 20/
OS_CYLINDER: SPH
OS_SPHERE: +2.25
OD_VPRISM_DIRECTION: SV
OD_SPHERE: +2.25
OS_VPRISM_DIRECTION: SV
OD_CYLINDER: SPH

## 2023-03-17 ASSESSMENT — REFRACTION_AUTOREFRACTION
OD_AXIS: 097
OS_CYLINDER: -0.50
OS_SPHERE: +0.25
OD_SPHERE: +0.25
OS_AXIS: 052
OD_CYLINDER: -0.50

## 2023-03-17 ASSESSMENT — SPHEQUIV_DERIVED
OD_SPHEQUIV: 0
OS_SPHEQUIV: 0
OD_SPHEQUIV: 0.75
OS_SPHEQUIV: 0.75
OS_SPHEQUIV: 0
OD_SPHEQUIV: 0

## 2023-03-17 ASSESSMENT — AXIALLENGTH_DERIVED
OD_AL: 23.8237
OS_AL: 23.6211
OS_AL: 23.9177
OD_AL: 23.8237
OD_AL: 23.5294
OS_AL: 23.9177

## 2023-03-17 ASSESSMENT — REFRACTION_MANIFEST
OS_CYLINDER: -0.50
OD_SPHERE: +0.25
OD_AXIS: 097
OD_CYLINDER: -0.50
OS_VA1: 20/20
OS_SPHERE: +1.00
OS_AXIS: 80
OD_CYLINDER: -0.50
OD_SPHERE: +1.00
OS_SPHERE: +0.25
OD_AXIS: 105
OS_CYLINDER: -0.50
OS_AXIS: 052
OD_VA1: 20/20
OD_VA1: 20/20
OS_VA1: 20/20

## 2023-03-17 ASSESSMENT — VISUAL ACUITY
OS_BCVA: 20/20
OD_BCVA: 20/20-2

## 2023-03-17 ASSESSMENT — KERATOMETRY
OS_AXISANGLE_DEGREES: 100
OS_K1POWER_DIOPTERS: 42.50
METHOD_AUTO_MANUAL: AUTO
OS_K2POWER_DIOPTERS: 42.75
OD_AXISANGLE_DEGREES: 084
OD_K2POWER_DIOPTERS: 43.25
OD_K1POWER_DIOPTERS: 42.50

## 2023-04-14 NOTE — H&P PST ADULT - FALLEN IN THE PAST
Med Requested:   Disp Refills Start End     amphetamine-dextroamphetamine (ADDERALL XR) 25 MG 24 hr capsule 30 capsule 0 3/16/2023     Sig - Route: Take 1 capsule by mouth daily. - Oral          Last visit: 01/09/2023  Recommended Follow Up: 3 months  No Show/Cancel: NONE  Next visit: 4/20/2023     Controlled Med - Routed to Provider due to NO PROTOCOL. Orders have been prepped according to providers note.       Last RX dispensed (per PDMP): 03/16/2023    
no

## 2023-04-21 ENCOUNTER — ASC (OUTPATIENT)
Dept: URBAN - METROPOLITAN AREA SURGERY 8 | Facility: SURGERY | Age: 78
Setting detail: OPHTHALMOLOGY
End: 2023-04-21
Payer: MEDICARE

## 2023-04-21 ENCOUNTER — RX ONLY (RX ONLY)
Age: 78
End: 2023-04-21

## 2023-04-21 DIAGNOSIS — H26.491: ICD-10-CM

## 2023-04-21 PROBLEM — H40.1131 PRIMARY OPEN ANGLE GLAUCOMA; BOTH EYES MILD: Status: ACTIVE | Noted: 2023-04-21

## 2023-04-21 PROBLEM — H25.13 CATARACT; BOTH EYES: Status: ACTIVE | Noted: 2023-04-21

## 2023-04-21 PROCEDURE — 66821 AFTER CATARACT LASER SURGERY: CPT | Performed by: OPHTHALMOLOGY

## 2023-04-21 ASSESSMENT — REFRACTION_AUTOREFRACTION
OS_SPHERE: +0.25
OD_AXIS: 097
OD_SPHERE: +0.25
OS_CYLINDER: -0.50
OD_CYLINDER: -0.50
OS_AXIS: 052

## 2023-04-21 ASSESSMENT — AXIALLENGTH_DERIVED
OD_AL: 23.8237
OD_AL: 23.5294
OS_AL: 23.9177
OS_AL: 23.9177
OD_AL: 23.8237
OS_AL: 23.6211

## 2023-04-21 ASSESSMENT — VISUAL ACUITY
OS_BCVA: 20/20
OD_BCVA: 20/20-2

## 2023-04-21 ASSESSMENT — REFRACTION_MANIFEST
OD_CYLINDER: -0.50
OD_AXIS: 105
OS_VA1: 20/20
OD_SPHERE: +1.00
OS_SPHERE: +0.25
OS_SPHERE: +1.00
OD_VA1: 20/20
OD_CYLINDER: -0.50
OS_CYLINDER: -0.50
OD_VA1: 20/20
OS_CYLINDER: -0.50
OS_VA1: 20/20
OD_SPHERE: +0.25
OS_AXIS: 80
OD_AXIS: 097
OS_AXIS: 052

## 2023-04-21 ASSESSMENT — REFRACTION_CURRENTRX
OS_VPRISM_DIRECTION: SV
OD_CYLINDER: SPH
OD_VPRISM_DIRECTION: SV
OS_SPHERE: +2.25
OS_CYLINDER: SPH
OD_OVR_VA: 20/
OD_SPHERE: +2.25
OS_OVR_VA: 20/

## 2023-04-21 ASSESSMENT — KERATOMETRY
OS_K2POWER_DIOPTERS: 42.75
OD_K2POWER_DIOPTERS: 43.25
OD_K1POWER_DIOPTERS: 42.50
METHOD_AUTO_MANUAL: AUTO
OD_AXISANGLE_DEGREES: 084
OS_K1POWER_DIOPTERS: 42.50
OS_AXISANGLE_DEGREES: 100

## 2023-04-21 ASSESSMENT — SPHEQUIV_DERIVED
OD_SPHEQUIV: 0
OS_SPHEQUIV: 0.75
OD_SPHEQUIV: 0
OS_SPHEQUIV: 0
OD_SPHEQUIV: 0.75
OS_SPHEQUIV: 0

## 2023-05-05 ENCOUNTER — OFFICE (OUTPATIENT)
Dept: URBAN - METROPOLITAN AREA CLINIC 12 | Facility: CLINIC | Age: 78
Setting detail: OPHTHALMOLOGY
End: 2023-05-05
Payer: MEDICARE

## 2023-05-05 DIAGNOSIS — H26.493: ICD-10-CM

## 2023-05-05 PROCEDURE — 99024 POSTOP FOLLOW-UP VISIT: CPT | Performed by: OPTOMETRIST

## 2023-05-05 ASSESSMENT — AXIALLENGTH_DERIVED
OD_AL: 23.965
OS_AL: 24.0602
OS_AL: 23.76
OD_AL: 23.6672
OD_AL: 23.965
OS_AL: 24.0096

## 2023-05-05 ASSESSMENT — REFRACTION_MANIFEST
OS_SPHERE: +1.00
OD_SPHERE: +1.00
OS_VA1: 20/20
OS_CYLINDER: -0.50
OS_SPHERE: +0.25
OD_AXIS: 097
OD_CYLINDER: -0.50
OD_CYLINDER: -0.50
OS_AXIS: 052
OD_VA1: 20/20
OD_VA1: 20/20
OS_AXIS: 80
OD_AXIS: 105
OS_CYLINDER: -0.50
OS_VA1: 20/20
OD_SPHERE: +0.25

## 2023-05-05 ASSESSMENT — CONFRONTATIONAL VISUAL FIELD TEST (CVF)
OD_FINDINGS: FULL
OS_FINDINGS: FULL

## 2023-05-05 ASSESSMENT — REFRACTION_CURRENTRX
OD_OVR_VA: 20/
OS_VPRISM_DIRECTION: SV
OD_VPRISM_DIRECTION: SV
OD_SPHERE: +2.25
OS_SPHERE: +2.25
OS_OVR_VA: 20/

## 2023-05-05 ASSESSMENT — REFRACTION_AUTOREFRACTION
OS_AXIS: 63
OD_SPHERE: +0.25
OS_CYLINDER: -0.75
OS_SPHERE: +0.50
OD_AXIS: 106
OD_CYLINDER: -0.50

## 2023-05-05 ASSESSMENT — SPHEQUIV_DERIVED
OD_SPHEQUIV: 0.75
OS_SPHEQUIV: 0.75
OD_SPHEQUIV: 0
OS_SPHEQUIV: 0.125
OD_SPHEQUIV: 0
OS_SPHEQUIV: 0

## 2023-05-05 ASSESSMENT — TONOMETRY
OD_IOP_MMHG: 15
OS_IOP_MMHG: 16

## 2023-05-05 ASSESSMENT — KERATOMETRY
OS_K2POWER_DIOPTERS: 42.25
OD_AXISANGLE_DEGREES: 81
OD_K1POWER_DIOPTERS: 42.25
METHOD_AUTO_MANUAL: AUTO
OS_AXISANGLE_DEGREES: 90
OD_K2POWER_DIOPTERS: 42.75
OS_K1POWER_DIOPTERS: 42.25

## 2023-05-05 ASSESSMENT — VISUAL ACUITY
OD_BCVA: 20/20-2
OS_BCVA: 20/20-2

## 2023-08-14 ENCOUNTER — OFFICE (OUTPATIENT)
Dept: URBAN - METROPOLITAN AREA CLINIC 12 | Facility: CLINIC | Age: 78
Setting detail: OPHTHALMOLOGY
End: 2023-08-14
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
## 2023-08-14 PROCEDURE — NO SHOW FE NO SHOW FEE: Performed by: AUDIOLOGIST-HEARING AID FITTER

## 2023-09-01 ENCOUNTER — OFFICE (OUTPATIENT)
Dept: URBAN - METROPOLITAN AREA CLINIC 12 | Facility: CLINIC | Age: 78
Setting detail: OPHTHALMOLOGY
End: 2023-09-01
Payer: MEDICARE

## 2023-09-01 DIAGNOSIS — H43.393: ICD-10-CM

## 2023-09-01 DIAGNOSIS — Z96.1: ICD-10-CM

## 2023-09-01 DIAGNOSIS — E11.9: ICD-10-CM

## 2023-09-01 DIAGNOSIS — H16.223: ICD-10-CM

## 2023-09-01 DIAGNOSIS — H35.373: ICD-10-CM

## 2023-09-01 DIAGNOSIS — H43.813: ICD-10-CM

## 2023-09-01 PROCEDURE — 92250 FUNDUS PHOTOGRAPHY W/I&R: CPT | Performed by: OPTOMETRIST

## 2023-09-01 PROCEDURE — 92014 COMPRE OPH EXAM EST PT 1/>: CPT | Performed by: OPTOMETRIST

## 2023-09-01 ASSESSMENT — TONOMETRY
OS_IOP_MMHG: 15
OD_IOP_MMHG: 15

## 2023-09-01 ASSESSMENT — REFRACTION_MANIFEST
OD_CYLINDER: -0.50
OD_VA1: 20/20
OS_AXIS: 052
OS_CYLINDER: -0.50
OS_AXIS: 80
OS_VA1: 20/20
OD_SPHERE: +0.25
OD_AXIS: 105
OS_SPHERE: +0.25
OD_AXIS: 097
OS_SPHERE: +1.00
OD_VA1: 20/20
OD_SPHERE: +1.00
OS_VA1: 20/20
OS_CYLINDER: -0.50
OD_CYLINDER: -0.50

## 2023-09-01 ASSESSMENT — AXIALLENGTH_DERIVED
OD_AL: 23.7135
OS_AL: 23.6672
OD_AL: 23.912
OD_AL: 24.0125
OS_AL: 23.965

## 2023-09-01 ASSESSMENT — SPHEQUIV_DERIVED
OD_SPHEQUIV: 0
OS_SPHEQUIV: 0.75
OD_SPHEQUIV: 0.25
OD_SPHEQUIV: 0.75
OS_SPHEQUIV: 0

## 2023-09-01 ASSESSMENT — REFRACTION_CURRENTRX
OS_OVR_VA: 20/
OD_VPRISM_DIRECTION: SV
OD_OVR_VA: 20/
OD_SPHERE: +2.25
OS_VPRISM_DIRECTION: SV
OS_SPHERE: +2.25

## 2023-09-01 ASSESSMENT — REFRACTION_AUTOREFRACTION
OD_AXIS: 096
OS_SPHERE: PLANO
OD_CYLINDER: -0.50
OS_CYLINDER: -0.50
OS_AXIS: 066
OD_SPHERE: +0.50

## 2023-09-01 ASSESSMENT — KERATOMETRY
OD_AXISANGLE_DEGREES: 074
OS_AXISANGLE_DEGREES: 090
OS_K1POWER_DIOPTERS: 42.50
METHOD_AUTO_MANUAL: AUTO
OD_K2POWER_DIOPTERS: 42.50
OS_K2POWER_DIOPTERS: 42.50
OD_K1POWER_DIOPTERS: 42.25

## 2023-09-01 ASSESSMENT — VISUAL ACUITY
OS_BCVA: 20/40+1
OD_BCVA: 20/25+2

## 2023-09-01 ASSESSMENT — SUPERFICIAL PUNCTATE KERATITIS (SPK)
OD_SPK: 1+
OS_SPK: 1+

## 2023-09-01 ASSESSMENT — CONFRONTATIONAL VISUAL FIELD TEST (CVF)
OS_FINDINGS: FULL
OD_FINDINGS: FULL

## 2023-09-06 ENCOUNTER — OFFICE (OUTPATIENT)
Dept: URBAN - METROPOLITAN AREA CLINIC 12 | Facility: CLINIC | Age: 78
Setting detail: OPHTHALMOLOGY
End: 2023-09-06
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
## 2023-09-06 PROCEDURE — NO SHOW FE NO SHOW FEE

## 2024-06-07 ENCOUNTER — OFFICE (OUTPATIENT)
Dept: URBAN - METROPOLITAN AREA CLINIC 12 | Facility: CLINIC | Age: 79
Setting detail: OPHTHALMOLOGY
End: 2024-06-07
Payer: MEDICARE

## 2024-06-07 DIAGNOSIS — H16.223: ICD-10-CM

## 2024-06-07 DIAGNOSIS — H10.45: ICD-10-CM

## 2024-06-07 PROCEDURE — 99213 OFFICE O/P EST LOW 20 MIN: CPT | Performed by: OPTOMETRIST

## 2024-06-07 ASSESSMENT — CONFRONTATIONAL VISUAL FIELD TEST (CVF)
OS_FINDINGS: FULL
OD_FINDINGS: FULL

## 2024-06-07 ASSESSMENT — LID EXAM ASSESSMENTS
OS_COMMENTS: ABSENT FB
OS_COMMENTS: UPPER LID EVERTED
OD_COMMENTS: UPPER LID EVERTED
OD_COMMENTS: ABSENT FB